# Patient Record
Sex: FEMALE | Race: ASIAN | NOT HISPANIC OR LATINO | Employment: FULL TIME | URBAN - METROPOLITAN AREA
[De-identification: names, ages, dates, MRNs, and addresses within clinical notes are randomized per-mention and may not be internally consistent; named-entity substitution may affect disease eponyms.]

---

## 2019-11-10 ENCOUNTER — HOSPITAL ENCOUNTER (EMERGENCY)
Facility: HOSPITAL | Age: 24
Discharge: HOME | End: 2019-11-10
Attending: FAMILY MEDICINE
Payer: MEDICAID

## 2019-11-10 VITALS
HEIGHT: 61 IN | SYSTOLIC BLOOD PRESSURE: 95 MMHG | WEIGHT: 113.44 LBS | TEMPERATURE: 99 F | OXYGEN SATURATION: 100 % | HEART RATE: 68 BPM | DIASTOLIC BLOOD PRESSURE: 62 MMHG | RESPIRATION RATE: 18 BRPM | BODY MASS INDEX: 21.42 KG/M2

## 2019-11-10 DIAGNOSIS — L25.9 CONTACT DERMATITIS, UNSPECIFIED CONTACT DERMATITIS TYPE, UNSPECIFIED TRIGGER: Primary | ICD-10-CM

## 2019-11-10 PROCEDURE — 99283 EMERGENCY DEPT VISIT LOW MDM: CPT

## 2019-11-10 RX ORDER — CEPHALEXIN 250 MG/1
250 CAPSULE ORAL 4 TIMES DAILY
Qty: 28 CAPSULE | Refills: 0 | Status: SHIPPED | OUTPATIENT
Start: 2019-11-10 | End: 2019-11-17

## 2019-11-10 RX ORDER — HYDROCORTISONE 25 MG/ML
LOTION TOPICAL 2 TIMES DAILY
Qty: 60 ML | Refills: 0 | Status: SHIPPED | OUTPATIENT
Start: 2019-11-10

## 2021-09-27 NOTE — ED PROVIDER NOTES
SCRIBE #1 NOTE: I, Alexandria Schuster, am scribing for, and in the presence of, CECILIA Mart. I have scribed the entire note.       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 female patient who presents to the Emergency Department for evaluation of bilateral hand swelling and itching which onset gradually several days PTA. 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 Pt states that she began wearing gloves at work once her sxs began. Symptoms are constant and moderate in severity. No mitigating or exacerbating factors reported. Associated sxs include rash to hands and arms. Patient denies any n/v/d, abd pain, CP, SOB, dizziness, HA and all other sxs at this time. Prior Tx includes Cortisone with no relief of sxs. No further complaints or concerns at this time.         Arrival mode: Personal vehicle     PCP: Primary Doctor No        Past Medical History:  Past Medical History:   Diagnosis Date    Medical history non-contributory        Past Surgical History:  Past Surgical History:   Procedure Laterality Date    INSERTION OF CONTRACEPTIVE CAPSULE           Family History:  Family History   Problem Relation Age of Onset    Hypertension Unknown        Social History:  Social History     Tobacco Use    Smoking status: Never Smoker   Substance and Sexual Activity    Alcohol use: No     Alcohol/week: 0.0 standard drinks    Drug use: No    Sexual activity: unknown        Review of Systems     Review of Systems   Constitutional: Negative for fever.   HENT: Negative for sore throat.    Respiratory: Negative for shortness of  [FreeTextEntry1] : Maranda is a 66di2up female with T1DM, euthyroid autoimmune thyroiditis, obesity, and insulin resistance on MDI and CGM Dexcom G6 sensor who comes for follow. \par Today's HgA1C is 6.9% which is significantly improved from 8 % on prior visit.  However, I am concerned about lows noted on CGM \par Also weight loss of 6 lbs noted with lifestyle modifications  \par Currently seeing psychiatry and being treated for depression - feels much better\par \par T1DM \par -Discussed decreasing Tresiba to 70 units from 75, ISF to 13 units (from 10 units) and I:C to 6 (from 5) \par -Aunt to bring in Transmitter for evaluation in about a week to see if lows improved and further changes to be done then.  \par -Advised to make sure covering all meals and snacks  \par -Note interested in InPen or Pump therapy at this time \par \par When a patient is on insulin, intensive monitoring of blood glucose levels is important to avoid hyperglycemia and hypoglycemia. Severe hypoglycemia can lead to seizure. Hyperglycemia can lead to ketosis requiring ICU admission and intravenous insulin.\par \par We went over pattern recognition and insulin dosage adjustments. \par I reviewed the long term complications of diabetes such as eye disease, kidney disease, and vascular complications and emphasized that with good control hopefully they can be avoided.\par \par The recommended hemoglobin A1C is 7.0 or below. The Hemoglobin A1C represents the average blood sugar over the past 3 months. \par \par Autoimmune thyroiditis\par -So far has been euthyroid with history of +Anti-TPO Ab\par -Normal TFTs on recent testing \par \par Hyperlipidemia\par -Lipid panel much improved (---> 116) \par \par Obesity:\par -Lost 6 lbs in 4 months \par -Making lifestyle modifications \par -Discussed comorbidities associated with obesity: including DM, fatty liver, HTN, SCFE, CHRIS, PCOS \par \par Acne-on face and back\par -Following with Derm- somewhat improved with clindamycin\par -Hyperandrogenism work up showed elevated total testo and mild elevation of DHEAS. \par -Discussed likely ovarian hyperandrogenism (periods are regular) ---> advised that can try OCPs --> Maranda not interested at this time \par \par Vitamin D deficiency\par Advised Vitamin D3- 2000 IU daily \par \par Depression \par Continue f/u with psychiatry-now on meds and feeling better \par Also notes that feels non-binary - not male or female gender - can discuss further at f/u visits; \par \par RTC in 3 months \par [employer] numbers in 1 weeks to see if lows improved  \par \par  "breath.    Cardiovascular: Negative for chest pain.   Gastrointestinal: Negative for abdominal pain, diarrhea, nausea and vomiting.   Genitourinary: Negative for dysuria.   Musculoskeletal: Negative for back pain.        (+) bilateral hand pain and itching   Skin: Positive for rash (bilateral arms and hands).   Neurological: Negative for dizziness, weakness and headaches.   Hematological: Does not bruise/bleed easily.   All other systems reviewed and are negative.     Physical Exam     Initial Vitals [11/10/19 1850]   BP Pulse Resp Temp SpO2   95/62 68 18 98.5 °F (36.9 °C) 100 %      MAP       --          Physical Exam  Nursing Notes and Vital Signs Reviewed.  Constitutional: Patient is in no acute distress. Well-developed and well-nourished.  Head: Atraumatic. Normocephalic.  Eyes: PERRL. EOM intact. Conjunctivae are not pale. No scleral icterus.  ENT: Mucous membranes are moist. Oropharynx is clear and symmetric.    Neck: Supple. Full ROM. No lymphadenopathy.  Cardiovascular: Regular rate. Regular rhythm. No murmurs, rubs, or gallops. Distal pulses are 2+ and symmetric.  Pulmonary/Chest: No respiratory distress. Clear to auscultation bilaterally. No wheezing or rales.  Abdominal: Soft and non-distended.  There is no tenderness.  No rebound, guarding, or rigidity. Good bowel sounds.  Genitourinary: No CVA tenderness  Musculoskeletal: Moves all extremities. No obvious deformities. No edema. No calf tenderness.  Skin: Excoriated, pruritic, cracked skin to the palmar and dorsal aspects of the bilateral hands.  Neurological:  Alert, awake, and appropriate.  Normal speech.  No acute focal neurological deficits are appreciated.  Psychiatric: Normal affect. Good eye contact. Appropriate in content.     ED Course   Procedures  ED Vital Signs:  Vitals:    11/10/19 1850   BP: 95/62   Pulse: 68   Resp: 18   Temp: 98.5 °F (36.9 °C)   TempSrc: Oral   SpO2: 100%   Weight: 51.5 kg (113 lb 6.8 oz)   Height: 5' 1" (1.549 m) "       Abnormal Lab Results:  Labs Reviewed - No data to display     All Lab Results:  None.    Imaging Results:  Imaging Results    None                     The Emergency Provider reviewed the vital signs and test results, which are outlined above.     ED Discussion       7:48 PM: Reassessed pt at this time.  Pt states her condition has improved at this time. Discussed with pt all pertinent ED information and results. Discussed pt dx and plan of tx. Gave pt all f/u and return to the ED instructions. All questions and concerns were addressed at this time. Pt expresses understanding of information and instructions, and is comfortable with plan to discharge. Pt is stable for discharge.    I discussed with patient and/or family/caretaker that evaluation in the ED does not suggest any emergent or life threatening medical conditions requiring immediate intervention beyond what was provided in the ED, and I believe patient is safe for discharge.  Regardless, an unremarkable evaluation in the ED does not preclude the development or presence of a serious of life threatening condition. As such, patient was instructed to return immediately for any worsening or change in current symptoms.                   ED Medication(s):  Medications - No data to display    New Prescriptions    CEPHALEXIN (KEFLEX) 250 MG CAPSULE    Take 1 capsule (250 mg total) by mouth 4 (four) times daily. for 7 days    HYDROCORTISONE 2.5 % LOTION    Apply topically 2 (two) times daily.       Follow-up Information     PCP. Go in 2 days.                     Scribe Attestation:   Scribe #1: I performed the above scribed service and the documentation accurately describes the services I performed. I attest to the accuracy of the note.     Attending:   Physician Attestation Statement for Scribe #1: I, CECILIA Mart, personally performed the services described in this documentation, as scribed by Alexandria Schuster, in my presence, and it is both accurate  and complete.           Clinical Impression       ICD-10-CM ICD-9-CM   1. Contact dermatitis, unspecified contact dermatitis type, unspecified trigger L25.9 692.9       Disposition:   Disposition: Discharged  Condition: Stable         CECILIA Mart  11/10/19 1958

## 2021-10-20 ENCOUNTER — HOSPITAL ENCOUNTER (EMERGENCY)
Facility: HOSPITAL | Age: 26
Discharge: HOME | End: 2021-10-20
Attending: EMERGENCY MEDICINE
Payer: MEDICAID

## 2021-10-20 VITALS
RESPIRATION RATE: 16 BRPM | DIASTOLIC BLOOD PRESSURE: 67 MMHG | WEIGHT: 112.44 LBS | HEART RATE: 78 BPM | BODY MASS INDEX: 21.23 KG/M2 | HEIGHT: 61 IN | SYSTOLIC BLOOD PRESSURE: 111 MMHG | OXYGEN SATURATION: 98 % | TEMPERATURE: 98 F

## 2021-10-20 DIAGNOSIS — L50.9 URTICARIA: Primary | ICD-10-CM

## 2021-10-20 PROCEDURE — 96372 THER/PROPH/DIAG INJ SC/IM: CPT

## 2021-10-20 PROCEDURE — 63600175 PHARM REV CODE 636 W HCPCS: Performed by: NURSE PRACTITIONER

## 2021-10-20 PROCEDURE — 99284 EMERGENCY DEPT VISIT MOD MDM: CPT | Mod: 25

## 2021-10-20 RX ORDER — DEXAMETHASONE SODIUM PHOSPHATE 4 MG/ML
8 INJECTION, SOLUTION INTRA-ARTICULAR; INTRALESIONAL; INTRAMUSCULAR; INTRAVENOUS; SOFT TISSUE
Status: COMPLETED | OUTPATIENT
Start: 2021-10-20 | End: 2021-10-20

## 2021-10-20 RX ADMIN — DEXAMETHASONE SODIUM PHOSPHATE 8 MG: 4 INJECTION INTRA-ARTICULAR; INTRALESIONAL; INTRAMUSCULAR; INTRAVENOUS; SOFT TISSUE at 01:10
